# Patient Record
Sex: FEMALE | Race: WHITE
[De-identification: names, ages, dates, MRNs, and addresses within clinical notes are randomized per-mention and may not be internally consistent; named-entity substitution may affect disease eponyms.]

---

## 2018-11-02 ENCOUNTER — HOSPITAL ENCOUNTER (EMERGENCY)
Dept: HOSPITAL 14 - H.ER | Age: 48
Discharge: HOME | End: 2018-11-02
Payer: COMMERCIAL

## 2018-11-02 VITALS
OXYGEN SATURATION: 98 % | RESPIRATION RATE: 16 BRPM | SYSTOLIC BLOOD PRESSURE: 127 MMHG | DIASTOLIC BLOOD PRESSURE: 79 MMHG | HEART RATE: 67 BPM

## 2018-11-02 VITALS — BODY MASS INDEX: 23 KG/M2

## 2018-11-02 VITALS — TEMPERATURE: 98.4 F

## 2018-11-02 DIAGNOSIS — M62.81: ICD-10-CM

## 2018-11-02 DIAGNOSIS — R42: Primary | ICD-10-CM

## 2018-11-02 LAB
ALBUMIN SERPL-MCNC: 3.9 G/DL (ref 3.5–5)
ALBUMIN/GLOB SERPL: 1.1 {RATIO} (ref 1–2.1)
ALT SERPL-CCNC: 26 U/L (ref 9–52)
AST SERPL-CCNC: 24 U/L (ref 14–36)
BASOPHILS # BLD AUTO: 0.1 K/UL (ref 0–0.2)
BASOPHILS NFR BLD: 0.7 % (ref 0–2)
BUN SERPL-MCNC: 13 MG/DL (ref 7–17)
CALCIUM SERPL-MCNC: 8.9 MG/DL (ref 8.4–10.2)
EOSINOPHIL # BLD AUTO: 0.1 K/UL (ref 0–0.7)
EOSINOPHIL NFR BLD: 0.6 % (ref 0–4)
ERYTHROCYTE [DISTWIDTH] IN BLOOD BY AUTOMATED COUNT: 13.1 % (ref 11.5–14.5)
GFR NON-AFRICAN AMERICAN: > 60
HGB BLD-MCNC: 14 G/DL (ref 12–16)
LYMPHOCYTES # BLD AUTO: 1.9 K/UL (ref 1–4.3)
LYMPHOCYTES NFR BLD AUTO: 19 % (ref 20–40)
MCH RBC QN AUTO: 29.2 PG (ref 27–31)
MCHC RBC AUTO-ENTMCNC: 33.7 G/DL (ref 33–37)
MCV RBC AUTO: 86.5 FL (ref 81–99)
MONOCYTES # BLD: 0.6 K/UL (ref 0–0.8)
MONOCYTES NFR BLD: 5.9 % (ref 0–10)
NEUTROPHILS # BLD: 7.6 K/UL (ref 1.8–7)
NEUTROPHILS NFR BLD AUTO: 73.8 % (ref 50–75)
NRBC BLD AUTO-RTO: 0.1 % (ref 0–0)
PLATELET # BLD: 272 K/UL (ref 130–400)
PMV BLD AUTO: 8.5 FL (ref 7.2–11.7)
RBC # BLD AUTO: 4.79 MIL/UL (ref 3.8–5.2)
WBC # BLD AUTO: 10.2 K/UL (ref 4.8–10.8)

## 2018-11-02 PROCEDURE — 81025 URINE PREGNANCY TEST: CPT

## 2018-11-02 PROCEDURE — 93005 ELECTROCARDIOGRAM TRACING: CPT

## 2018-11-02 PROCEDURE — 82948 REAGENT STRIP/BLOOD GLUCOSE: CPT

## 2018-11-02 PROCEDURE — 99283 EMERGENCY DEPT VISIT LOW MDM: CPT

## 2018-11-02 PROCEDURE — 84484 ASSAY OF TROPONIN QUANT: CPT

## 2018-11-02 PROCEDURE — 85025 COMPLETE CBC W/AUTO DIFF WBC: CPT

## 2018-11-02 PROCEDURE — 70450 CT HEAD/BRAIN W/O DYE: CPT

## 2018-11-02 PROCEDURE — 71045 X-RAY EXAM CHEST 1 VIEW: CPT

## 2018-11-02 PROCEDURE — 80053 COMPREHEN METABOLIC PANEL: CPT

## 2018-11-02 NOTE — RAD
Date of service: 



11/02/2018



HISTORY:

 chest pain 



COMPARISON:

Chest radiographs 11/22/2014. 



FINDINGS:



LUNGS:

No active pulmonary disease.



PLEURA:

No significant pleural effusion identified, no pneumothorax apparent.



CARDIOVASCULAR:

No aortic atherosclerotic calcification present.



Normal cardiac size. No pulmonary vascular congestion. 



OSSEOUS STRUCTURES:

No significant abnormalities.



VISUALIZED UPPER ABDOMEN:

Normal.



OTHER FINDINGS:

None.



IMPRESSION:

No interval acute cardiopulmonary disease appreciated.

## 2018-11-02 NOTE — ED PDOC
HPI: General Adult


Time Seen by Provider: 11/02/18 11:03


Chief Complaint (Nursing): Dizziness/Lightheaded


Chief Complaint (Provider): Dizziness


History Per: Patient, Family


History/Exam Limitations: no limitations


Onset/Duration Of Symptoms: Days (yesterday)


Additional Complaint(s): 





Pt. with dizziness like room spinning.  Also nausea, vomit nonbloody, and 

weakness all over.  States she had similar last week and got better.  No 

dyspnea.  No numbness, tingles, headaches.  Had cough, congestion, runny nose 

for a few days, gone now.  No vision changes.  





Past Medical History


Reviewed: Nursing Documentation, Vital Signs


Vital Signs: 





                                Last Vital Signs











Temp  97.9 F   11/02/18 10:51


 


Pulse  70   11/02/18 10:51


 


Resp  20   11/02/18 10:51


 


BP  134/73   11/02/18 10:51


 


Pulse Ox  99   11/02/18 10:51














- Medical History


PMH: Diabetes, Hypercholesterolemia





- Surgical History


Surgical History: No Surg Hx





- Family History


Family History: States: Unknown Family Hx





- Living Arrangements


Living Arrangements: With Family





- Social History


Alcohol: None


Drugs: Denies





- Home Medications


Home Medications: 


                                Ambulatory Orders











 Medication  Instructions  Recorded


 


Clindamycin [Cleocin] 300 mg PO TID #21 cap 06/19/15


 


Meclizine [Meclizine*] 25 mg PO DAILY PRN 5 Days  tab 11/02/18














- Allergies


Allergies/Adverse Reactions: 


                                    Allergies











Allergy/AdvReac Type Severity Reaction Status Date / Time


 


iodine Allergy  RASH Verified 11/02/18 11:10


 


Penicillins Allergy  RASH Verified 11/02/18 11:10














Review of Systems


ROS Statement: Except As Marked, All Systems Reviewed And Found Negative


Constitutional: Positive for: Weakness


Gastrointestinal: Positive for: Nausea, Vomiting.  Negative for: Abdominal Pain,

Diarrhea


Neurological: Positive for: Weakness, Dizziness





Physical Exam





- Reviewed


Nursing Documentation Reviewed: Yes


Vital Signs Reviewed: Yes





- Physical Exam


Appears: Positive for: Well, Non-toxic, No Acute Distress


Head Exam: Positive for: ATRAUMATIC, NORMAL INSPECTION, NORMOCEPHALIC


Skin: Positive for: Normal Color, Warm, DRY


Eye Exam: Positive for: EOMI, Normal appearance, PERRL


ENT: Positive for: Normal ENT Inspection.  Negative for: Nasal Congestion, 

Pharyngeal Erythema


Neck: Positive for: Normal, Painless ROM, Supple


Cardiovascular/Chest: Positive for: Regular Rate, Rhythm


Respiratory: Positive for: CNT, Normal Breath Sounds


Gastrointestinal/Abdominal: Positive for: Normal Exam, Soft.  Negative for: 

Tenderness


Back: Positive for: Normal Inspection.  Negative for: L CVA Tenderness, R CVA 

Tenderness


Extremity: Positive for: Normal ROM.  Negative for: Tenderness, Pedal Edema


Neurologic/Psych: Positive for: Alert, CNs II-XII, Oriented.  Negative for: 

Motor/Sensory Deficits, Aphasia, Facial Droop





- Laboratory Results


Result Diagrams: 


                                 11/02/18 11:44





                                 11/02/18 11:44


Interpretation Of Abn Labs: no acute





- ECG


ECG: Positive for: Interpreted By Me, Viewed By Me (c)


ECG Rhythm: Positive for: Normal QRS, Normal ST Segment, Sinus Rhythm


O2 Sat by Pulse Oximetry: 99


Pulse Ox Interpretation: Normal





- Radiology


X-Ray: Interpreted by Me, Viewed By Me


X-Ray Interpretation: No Acute Disease





- CT Scan/US


  ** ct


Other Rad Studies (CT/US): Read By Radiologist


Other Rad Interpretation: no acute





- Progress


ED Course And Treament: 





1423:  Feels much better.  AAOx3.  No chest pain, dizziness, weakness.  

Tolerated PO.  Fu with pcp.





Disposition





- Clinical Impression


Clinical Impression: 


 Dizziness








- Patient ED Disposition


Is Patient to be Admitted: No





- Disposition


Referrals: 


Trinity Hernandez MD [Primary Care Provider] - 11/05/18


Disposition: Routine/Home


Disposition Time: 14:24


Condition: STABLE


Additional Instructions: 


Return if not better in 3 days. 


Prescriptions: 


Meclizine [Meclizine*] 25 mg PO DAILY PRN 5 Days  tab


 PRN Reason: Dizziness


Instructions:  Vertigo (a Type of Dizziness) (DC)


Print Language: Mongolian

## 2018-11-02 NOTE — CARD
--------------- APPROVED REPORT --------------





Date of service: 11/02/2018



EKG Measurement

Heart [id number]

ME 128P0

[id number]

[id number]

[id number]



<Conclusion>

Sinus bradycardia

Low voltage QRS

Borderline ECG

## 2018-11-02 NOTE — CT
Date of service: 



11/02/2018



PROCEDURE:  CT HEAD WITHOUT CONTRAST.



HISTORY:

headache



COMPARISON:

None available.



TECHNIQUE:

Axial computed tomography images were obtained through the head/brain 

without intravenous contrast.  



Radiation dose:



Total exam DLP = 797.14 mGy-cm.



This CT exam was performed using one or more of the following dose 

reduction techniques: Automated exposure control, adjustment of the 

mA and/or kV according to patient size, and/or use of iterative 

reconstruction technique.



FINDINGS:



HEMORRHAGE:

No intracranial hemorrhage. 



BRAIN:

Normal gray-white matter differentiation and density are appreciated 

throughout the cerebrum and cerebellum with the brainstem appearing 

unremarkable as well.  There is no mass effect.  There is no 

suspicious extra-axial fluid collection and the midline brain anatomy 

appears diffusely unremarkable. 



VENTRICLES:

Unremarkable. No hydrocephalus. 



CALVARIUM:

No destructive bony lesion or displaced fracture identified including 

through the skullbase.



PARANASAL SINUSES:

Unremarkable as visualized. No significant inflammatory changes.



MASTOID AIR CELLS:

Unremarkable as visualized. No inflammatory changes.



OTHER FINDINGS:

None.



IMPRESSION:

Unremarkable unenhanced CT of the Head.